# Patient Record
Sex: MALE | Race: WHITE | URBAN - METROPOLITAN AREA
[De-identification: names, ages, dates, MRNs, and addresses within clinical notes are randomized per-mention and may not be internally consistent; named-entity substitution may affect disease eponyms.]

---

## 2017-09-12 ENCOUNTER — THERAPY VISIT (OUTPATIENT)
Dept: PHYSICAL THERAPY | Facility: CLINIC | Age: 52
End: 2017-09-12
Payer: COMMERCIAL

## 2017-09-12 DIAGNOSIS — M25.551 HIP PAIN, RIGHT: Primary | ICD-10-CM

## 2017-09-12 PROCEDURE — 97110 THERAPEUTIC EXERCISES: CPT | Mod: GP | Performed by: PHYSICAL THERAPIST

## 2017-09-12 PROCEDURE — 97161 PT EVAL LOW COMPLEX 20 MIN: CPT | Mod: GP | Performed by: PHYSICAL THERAPIST

## 2017-09-12 NOTE — MR AVS SNAPSHOT
"              After Visit Summary   9/12/2017    Aki Salazar    MRN: [id number]           Patient Information     Date Of Birth          1965        Visit Information        Provider Department      9/12/2017 9:10 AM Mansoor Mckee, PT [facility] PT        Today's Diagnoses     Hip pain, right    -  1       Follow-ups after your visit        Your next 10 appointments already scheduled     Sep 20, 2017  7:00 AM CDT   MEET Extremity with Mansoor Mckee PT   [facility] PT ([facility])    [address] [facility] [address] [phone]              Who to contact     If you have questions or need follow up information about today's clinic visit or your schedule please contact [facility] PT directly at [phone].  Normal or non-critical lab and imaging results will be communicated to you by MyChart, letter or phone within 4 business days after the clinic has received the results. If you do not hear from us within 7 days, please contact the clinic through MyChart or phone. If you have a critical or abnormal lab result, we will notify you by phone as soon as possible.  Submit refill requests through Five minutes or call your pharmacy and they will forward the refill request to us. Please allow 3 business days for your refill to be completed.          Additional Information About Your Visit        MyChart Information     Five minutes lets you send messages to your doctor, view your test results, renew your prescriptions, schedule appointments and more. To sign up, go to [email or web address] . Click on \"Log in\" on the left side of the screen, which will take you to the Welcome page. Then click on \"Sign up Now\" on the right side of the page.     You will be asked to enter the access code listed below, as well as some personal information. Please follow the directions to create " your username and password.     Your access code is: [email or web address]  Expires: 2017 12:40 PM     Your access code will  in 90 days. If you need help or a new code, please call your [place] clinic or [phone].        Care EveryWhere ID     This is your Care EveryWhere ID. This could be used by other organizations to access your [place] medical records  [id number]         Blood Pressure from Last 3 Encounters:   14 130/84   14 138/84    Weight from Last 3 Encounters:   14 134.7 kg (297 lb)   04/15/14 135.6 kg (299 lb)   14 135.7 kg (299 lb 3.2 oz)              We Performed the Following     HC PT EVAL, LOW COMPLEXITY     MEET INITIAL EVAL REPORT     THERAPEUTIC EXERCISES        Primary Care Provider Office Phone # Fax #    Phoenix Dougherty [phone]       [address] [facility] [address]        Equal Access to Services     [facility]: Hadii aad ku hadasho Soomaali, waaxda luqadaha, qaybta kaalmada adeegyada, waxay idiin hayaan adeeg khalicia bunch . So [facility] [phone].    ATENCIÓN: Si habla español, tiene a pal disposición servicios gratuitos de asistencia lingüística. Llame al [phone].    We comply with applicable federal civil rights laws and [place] laws. We do not discriminate on the basis of race, color, national origin, age, disability sex, sexual orientation or gender identity.            Thank you!     Thank you for choosing [facility]  for your care. Our goal is always to provide you with excellent care. Hearing back from our patients is one way we can continue to improve our services. Please take a few minutes to complete the written survey that you may receive in the mail after your visit with us. Thank you!             Your Updated Medication List - Protect others around you: Learn how to safely use, store and throw away your medicines at www.disposemymeds.org.          This list is  accurate as of: 9/12/17 12:40 PM.  Always use your most recent med list.                   Brand Name Dispense Instructions for use Diagnosis    atenolol 25 MG tablet    TENORMIN     Take 25 mg by mouth daily        diclofenac 1 % Gel topical gel    VOLTAREN    100 g    Apply 4 grams to knees or 2 grams to hands four times daily using enclosed dosing card.    Left knee pain       lisinopril 40 MG tablet    PRINIVIL/ZESTRIL     Take 40 mg by mouth daily        loratadine 10 MG tablet    CLARITIN     Take 10 mg by mouth as needed for allergies        omeprazole 20 MG CR capsule    priLOSEC     Take by mouth daily

## 2017-09-12 NOTE — PROGRESS NOTES
[facility] Initial Evaluation      Subjective:    Patient is a 51 year old male presenting with rehab right hip hpi. The history is provided by the patient.   Aki Salazar is a 51 year old male with a right hip condition.  Condition occurred with:  Insidious onset.  Condition occurred: for unknown reasons.  This is a chronic condition  MD referral 6/30/2017.  Pain maybe for 6 months.  Sx come and go.  .    Patient reports pain:  Lateral and posterior.  Radiates to:  No radiation.  Pain is described as aching and sharp and is intermittent and reported as 2/10.  Associated symptoms:  Catching, locking and buckling/giving out. Pain is worse during the day.  Symptoms are exacerbated by walking and transfers (in/ out of car) and relieved by nothing.  Since onset symptoms are unchanged.  Special tests:  X-ray (normal per pt).      General health as reported by patient is fair.  Pertinent medical history includes:  Overweight and high blood pressure.  Medical allergies: no.  Other surgeries include:  None reported.  Current medications:  High blood pressure medication.  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  Patient is working in normal job without restrictions.  Primary job tasks include:  Driving and lifting.    Barriers include:  None as reported by the patient.    Red flags:  None as reported by the patient.                        Objective:    System         Lumbar/SI Evaluation  ROM:  AROM Lumbar: normal    Strength: poor trunk strength  Lumbar Myotomes:  normal                  Neural Tension/Mobility:        Right side:   Slump or SLR  negative.   Lumbar Palpation:      Tenderness present at Right: Piriformis and Gluteus Medius                                          Hip Evaluation  HIP AROM:  AROM:   Left Hip:        Right Hip:   Normal                  Hip PROM:  Hip PROM:  Left Hip:    Right Hip:  Normal                          Hip Strength:  Hip Strength:   Left:     Right:   Normal                          Hip Special Testing:       Right hip positive for the following special tests:  FabreRight hip negative for the following special tests:  Distraction or SLR    Hip Palpation:      Right hip tenderness present at:  Piriformis and Gluteus Medius             General     ROS    Assessment/Plan:      Patient is a 51 year old male with right side hip complaints.    Patient has the following significant findings with corresponding treatment plan.                Diagnosis 1:  R hip pain  Decreased ROM/flexibility - manual therapy and therapeutic exercise  Impaired muscle performance - neuro re-education  Decreased function - therapeutic activities    Therapy Evaluation Codes:   1) History comprised of:   Personal factors that impact the plan of care:      None.    Comorbidity factors that impact the plan of care are:      High blood pressure and Overweight.     Medications impacting care: High blood pressure.  2) Examination of Body Systems comprised of:   Body structures and functions that impact the plan of care:      Hip.   Activity limitations that impact the plan of care are:      Walking.  3) Clinical presentation characteristics are:   Stable/Uncomplicated.  4) Decision-Making    Low complexity using standardized patient assessment instrument and/or measureable assessment of functional outcome.  Cumulative Therapy Evaluation is: Low complexity.    Previous and current functional limitations:  (See Goal Flow Sheet for this information)    Short term and Long term goals: (See Goal Flow Sheet for this information)     Communication ability:  Patient appears to be able to clearly communicate and understand verbal and written communication and follow directions correctly.  Treatment Explanation - The following has been discussed with the patient:   RX ordered/plan of care  Anticipated outcomes  Possible risks and side effects  This patient would benefit from PT intervention to resume normal  activities.   Rehab potential is good.    Frequency:  1 X week, once daily  Duration:  for 6 weeks  Discharge Plan:  Achieve all LTG.  Independent in home treatment program.  Reach maximal therapeutic benefit.    Please refer to the daily flowsheet for treatment today, total treatment time and time spent performing 1:1 timed codes.

## 2017-09-12 NOTE — LETTER
[place] [facility]  [address] [facility] [address]  [phone]  2017  Re: Aki Salazar   :   1965  MRN:  [id number]   REFERRING PHYSICIAN:   Haley Snell  [facility] PT  Date of Initial Evaluation:  2017  Visits: 1 Rxs Used: 1  Reason for Referral:  Hip pain, right  EVALUATION SUMMARY  [facility]  Subjective:  Patient is a 51 year old male presenting with rehab right hip hpi. The history is provided by the patient.   Aki Salazar is a 51 year old male with a right hip condition.  Condition occurred with:  Insidious onset.  Condition occurred: for unknown reasons.  This is a chronic condition  MD referral 2017.  Pain maybe for 6 months.  Sx come and go.  .    Patient reports pain:  Lateral and posterior.  Radiates to:  No radiation.  Pain is described as aching and sharp and is intermittent and reported as 2/10.  Associated symptoms:  Catching, locking and buckling/giving out. Pain is worse during the day.  Symptoms are exacerbated by walking and transfers (in/ out of car) and relieved by nothing.  Since onset symptoms are unchanged.  Special tests:  X-ray (normal per pt).      General health as reported by patient is fair.  Pertinent medical history includes:  Overweight and high blood pressure.  Medical allergies: no.  Other surgeries include:  None reported.  Current medications:  High blood pressure medication.  Current occupation is [employer].  Patient is working in normal job without restrictions.  Primary job tasks include:  Driving and lifting.  Barriers include:  None as reported by the patient.  Red flags:  None as reported by the patient.  Objective:  System   Lumbar/SI Evaluation  ROM:  AROM Lumbar: normal  Strength: poor trunk strength  Lumbar Myotomes:  normal  Neural Tension/Mobility:    Right side:   Slump or SLR   negative.   Lumbar Palpation:    Tenderness present at Right: Piriformis and Gluteus Medius  Hip Evaluation  HIP AROM:  AROM:   Left Hip:        Right Hip:   Normal  Hip PROM:  Hip PROM:  Left Hip:    Right Hip:  Normal   Hip Strength:  Hip Strength:   Left:     Right:  Normal    Re: Aki Salazar   :   1965  Hip Special Testing:    Right hip positive for the following special tests:  FabreRight hip negative for the following special tests:  Distraction or SLR    Hip Palpation:    Right hip tenderness present at:  Piriformis and Gluteus Medius  General   ROS  Assessment/Plan:    Patient is a 51 year old male with right side hip complaints.    Patient has the following significant findings with corresponding treatment plan.                Diagnosis 1:  R hip pain  Decreased ROM/flexibility - manual therapy and therapeutic exercise  Impaired muscle performance - neuro re-education  Decreased function - therapeutic activities  Therapy Evaluation Codes:   1) History comprised of:   Personal factors that impact the plan of care:      None.    Comorbidity factors that impact the plan of care are:      High blood pressure and Overweight.     Medications impacting care: High blood pressure.  2) Examination of Body Systems comprised of:   Body structures and functions that impact the plan of care:      Hip.   Activity limitations that impact the plan of care are:      Walking.  3) Clinical presentation characteristics are:   Stable/Uncomplicated.  4) Decision-Making    Low complexity using standardized patient assessment instrument and/or measureable assessment of functional outcome.  Cumulative Therapy Evaluation is: Low complexity.  Previous and current functional limitations:  (See Goal Flow Sheet for this information)    Short term and Long term goals: (See Goal Flow Sheet for this information)   Communication ability:  Patient appears to be able to clearly communicate and understand verbal and written  communication and follow directions correctly.  Treatment Explanation - The following has been discussed with the patient:   RX ordered/plan of care  Anticipated outcomes  Possible risks and side effects  This patient would benefit from PT intervention to resume normal activities.   Rehab potential is good.  Frequency:  1 X week, once daily  Duration:  for 6 weeks  Discharge Plan:  Achieve all LTG.  Independent in home treatment program.  Reach maximal therapeutic benefit.  Thank you for your referral.  INQUIRIES  Therapist: Mansoor Mckee PT   [facility]  [address] [place] [address]  Phone: [phone]  Fax: [phone]

## 2017-09-22 ENCOUNTER — THERAPY VISIT (OUTPATIENT)
Dept: PHYSICAL THERAPY | Facility: CLINIC | Age: 52
End: 2017-09-22
Payer: COMMERCIAL

## 2017-09-22 DIAGNOSIS — M25.551 HIP PAIN, RIGHT: ICD-10-CM

## 2017-09-22 PROCEDURE — 97110 THERAPEUTIC EXERCISES: CPT | Mod: GP | Performed by: PHYSICAL THERAPIST

## 2017-10-06 ENCOUNTER — THERAPY VISIT (OUTPATIENT)
Dept: PHYSICAL THERAPY | Facility: CLINIC | Age: 52
End: 2017-10-06
Payer: COMMERCIAL

## 2017-10-06 DIAGNOSIS — M25.551 HIP PAIN, RIGHT: ICD-10-CM

## 2017-10-06 PROCEDURE — 97110 THERAPEUTIC EXERCISES: CPT | Mod: GP | Performed by: PHYSICAL THERAPIST

## 2017-10-06 PROCEDURE — 97530 THERAPEUTIC ACTIVITIES: CPT | Mod: GP | Performed by: PHYSICAL THERAPIST

## 2017-10-06 NOTE — PROGRESS NOTES
Subjective:    HPI                    Objective:    System    Physical Exam    General     ROS    Assessment/Plan:      DISCHARGE REPORT    Progress reporting period is from 9/12/2017 to 10/6/2017.       SUBJECTIVE  Subjective changes noted by patient:   Doing well.  Minimal pain lately    Current pain level is  0/10.     Previous pain level was   2/10.   Changes in function:  Yes (See Goal flowsheet attached for changes in current functional level)  Adverse reaction to treatment or activity: None    OBJECTIVE  Objective: Coreclty demo exercises.    strength improved     ASSESSMENT/PLAN  Updated problem list and treatment plan:Diagnosis 1:  R hip pain  Decreased ROM/flexibility - manual therapy and therapeutic exercise  Impaired muscle performance - neuro re-education  Decreased function - therapeutic activities      STG/LTGs have been met or progress has been made towards goals:  Yes (See Goal flow sheet completed today.)  Assessment of Progress: The patient's condition is improving.  The patient has met all of their long term goals.  Self Management Plans:  Patient has been instructed in a home treatment program.    Aki continues to require the following intervention to meet STG and LTG's:  PT intervention is no longer required to meet STG/LTG.    Recommendations:  This patient is ready to be discharged from therapy and continue their home treatment program.    Please refer to the daily flowsheet for treatment today, total treatment time and time spent performing 1:1 timed codes.

## 2017-10-06 NOTE — MR AVS SNAPSHOT
"              After Visit Summary   10/6/2017    Aki Slaazar    MRN: [id number]           Patient Information     Date Of Birth          1965        Visit Information        Provider Department      10/6/2017 8:10 AM Mansoor Mckee, PT [facility] PT        Today's Diagnoses     Hip pain, right           Follow-ups after your visit        Who to contact     If you have questions or need follow up information about today's clinic visit or your schedule please contact [facility] PT directly at [phone].  Normal or non-critical lab and imaging results will be communicated to you by [facility], letter or phone within 4 business days after the clinic has received the results. If you do not hear from us within 7 days, please contact the clinic through [employer] or phone. If you have a critical or abnormal lab result, we will notify you by phone as soon as possible.  Submit refill requests through Dolphin or call your pharmacy and they will forward the refill request to us. Please allow 3 business days for your refill to be completed.          Additional Information About Your Visit        MyChart Information     Dolphin lets you send messages to your doctor, view your test results, renew your prescriptions, schedule appointments and more. To sign up, go to [email or web address] . Click on \"Log in\" on the left side of the screen, which will take you to the Welcome page. Then click on \"Sign up Now\" on the right side of the page.     You will be asked to enter the access code listed below, as well as some personal information. Please follow the directions to create your username and password.     Your access code is: [email or web address]  Expires: 2017 12:40 PM     Your access code will  in 90 days. If you need help or a new code, please call your [place] clinic or [phone].        Care EveryWhere ID     This is your Care " EveryWhere ID. This could be used by other organizations to access your [place] medical records  [id number]         Blood Pressure from Last 3 Encounters:   05/19/14 130/84   03/14/14 138/84    Weight from Last 3 Encounters:   05/19/14 134.7 kg (297 lb)   04/15/14 135.6 kg (299 lb)   03/14/14 135.7 kg (299 lb 3.2 oz)              We Performed the Following     MEET PROGRESS NOTES REPORT     THERAPEUTIC ACTIVITIES     THERAPEUTIC EXERCISES        Primary Care Provider Office Phone # Fax #    [place] Dougherty [phone]       [address] [facility] [address]        Equal Access to Services     [facility] : Hadii aad ku hadasho Soadarshali, waaxda luqadaha, qaybta kaalmada adeegyada, carol ann bunch . So [facility] [phone].    ATENCIÓN: Si habla español, tiene a pal disposición servicios gratuitos de asistencia lingüística. Llame al [phone].    We comply with applicable federal civil rights laws and [place] laws. We do not discriminate on the basis of race, color, national origin, age, disability, sex, sexual orientation, or gender identity.            Thank you!     Thank you for choosing [facility]  for your care. Our goal is always to provide you with excellent care. Hearing back from our patients is one way we can continue to improve our services. Please take a few minutes to complete the written survey that you may receive in the mail after your visit with us. Thank you!             Your Updated Medication List - Protect others around you: Learn how to safely use, store and throw away your medicines at www.disposemymeds.org.          This list is accurate as of: 10/6/17  8:42 AM.  Always use your most recent med list.                   Brand Name Dispense Instructions for use Diagnosis    atenolol 25 MG tablet    TENORMIN     Take 25 mg by mouth daily        diclofenac 1 % Gel topical gel    VOLTAREN    100 g     Apply 4 grams to knees or 2 grams to hands four times daily using enclosed dosing card.    Left knee pain       lisinopril 40 MG tablet    PRINIVIL/ZESTRIL     Take 40 mg by mouth daily        loratadine 10 MG tablet    CLARITIN     Take 10 mg by mouth as needed for allergies        omeprazole 20 MG CR capsule    priLOSEC     Take by mouth daily

## 2017-10-06 NOTE — LETTER
[facility] PT  [address] [facility] [id number] [phone]    2017  Re: Aki Salazar   :   1965  MRN:  [id number]   REFERRING PHYSICIAN:   Haley Snell  [facility] PT  Date of Initial Evaluation: 2017  Visits: 3 Rxs Used: 3  Reason for Referral:  Hip pain, right    DISCHARGE REPORT  Progress reporting period is from 2017 to 10/6/2017.       SUBJECTIVE  Subjective changes noted by patient:   Doing well.  Minimal pain lately    Current pain level is  0/10.     Previous pain level was   2/10.   Changes in function:  Yes (See Goal flowsheet attached for changes in current functional level)  Adverse reaction to treatment or activity: None  OBJECTIVE  Objective: Coreclty demo exercises.   strength improved     ASSESSMENT/PLAN  Updated problem list and treatment plan:Diagnosis 1:  R hip pain  Decreased ROM/flexibility - manual therapy and therapeutic exercise  Impaired muscle performance - neuro re-education  Decreased function - therapeutic activities  STG/LTGs have been met or progress has been made towards goals:  Yes (See Goal flow sheet completed today.)  Assessment of Progress: The patient's condition is improving.  The patient has met all of their long term goals.  Self Management Plans:  Patient has been instructed in a home treatment program.  Aki continues to require the following intervention to meet STG and LTG's:  PT intervention is no longer required to meet STG/LTG.    Recommendations:  This patient is ready to be discharged from therapy and continue their home treatment program.    Thank you for your referral.    INQUIRIES  Therapist: Mansoor Mckee PT  [facility]  [address] [facility] [address]  Phone: [phone]  Fax: [phone]